# Patient Record
Sex: MALE | NOT HISPANIC OR LATINO
[De-identification: names, ages, dates, MRNs, and addresses within clinical notes are randomized per-mention and may not be internally consistent; named-entity substitution may affect disease eponyms.]

---

## 2024-04-03 PROBLEM — R22.2 MASS ON BACK: Status: ACTIVE | Noted: 2024-04-03

## 2024-04-04 ENCOUNTER — APPOINTMENT (OUTPATIENT)
Dept: SURGICAL ONCOLOGY | Facility: CLINIC | Age: 54
End: 2024-04-04
Payer: COMMERCIAL

## 2024-04-04 ENCOUNTER — RESULT REVIEW (OUTPATIENT)
Age: 54
End: 2024-04-04

## 2024-04-04 VITALS
OXYGEN SATURATION: 98 % | RESPIRATION RATE: 17 BRPM | HEART RATE: 87 BPM | BODY MASS INDEX: 24.29 KG/M2 | SYSTOLIC BLOOD PRESSURE: 116 MMHG | WEIGHT: 164 LBS | HEIGHT: 69 IN | DIASTOLIC BLOOD PRESSURE: 76 MMHG

## 2024-04-04 DIAGNOSIS — R22.2 LOCALIZED SWELLING, MASS AND LUMP, TRUNK: ICD-10-CM

## 2024-04-04 PROCEDURE — 99204 OFFICE O/P NEW MOD 45 MIN: CPT

## 2024-04-04 NOTE — END OF VISIT
Called [facility] and that medication is on a nationwide back order, not just them.  They need a new medication sent.    [Time Spent: ___ minutes] : I have spent [unfilled] minutes of time on the encounter.

## 2024-04-24 ENCOUNTER — APPOINTMENT (OUTPATIENT)
Dept: ULTRASOUND IMAGING | Facility: IMAGING CENTER | Age: 54
End: 2024-04-24
Payer: COMMERCIAL

## 2024-04-24 ENCOUNTER — OUTPATIENT (OUTPATIENT)
Dept: OUTPATIENT SERVICES | Facility: HOSPITAL | Age: 54
LOS: 1 days | End: 2024-04-24
Payer: COMMERCIAL

## 2024-04-24 ENCOUNTER — RESULT REVIEW (OUTPATIENT)
Age: 54
End: 2024-04-24

## 2024-04-24 DIAGNOSIS — R22.2 LOCALIZED SWELLING, MASS AND LUMP, TRUNK: ICD-10-CM

## 2024-04-24 PROCEDURE — 88173 CYTOPATH EVAL FNA REPORT: CPT | Mod: 26

## 2024-04-24 PROCEDURE — 10005 FNA BX W/US GDN 1ST LES: CPT

## 2024-04-24 PROCEDURE — 88305 TISSUE EXAM BY PATHOLOGIST: CPT | Mod: 26

## 2024-04-24 PROCEDURE — 88305 TISSUE EXAM BY PATHOLOGIST: CPT

## 2024-04-24 PROCEDURE — 88172 CYTP DX EVAL FNA 1ST EA SITE: CPT

## 2024-04-24 PROCEDURE — 88173 CYTOPATH EVAL FNA REPORT: CPT

## 2024-04-29 LAB — NON-GYNECOLOGICAL CYTOLOGY STUDY: SIGNIFICANT CHANGE UP

## 2024-04-30 NOTE — ASSESSMENT
[FreeTextEntry1] : 53-year-old man.  Referred with a ~10 cm soft tissue mass of the left lower back that on imaging appears to be subcutaneous in location. Differential diagnosis based on imaging includes a soft tissue neoplasm. Discs from [facility] retained and submitted for entry into our system.  I have recommended an image guided needle biopsy to establish a diagnosis, the results of which will guide further management.  Reviewed in detail, all questions answered.  He understands and agrees. Paperwork provided to have a sonogram guided needle biopsy performed through the health system. He will call me a week after the procedure to discuss the results. That conversation will guide further management.  He understands that even if benign, given the size, and the fact that it is symptomatic, but ultimately resection of the mass may be the safest course of action.  Presently, no note dictated since he was self-referred.   4/30/2024: We spoke on the phone. April 24, 2024, he had a needle biopsy of the lower back mass at 450. Results: Negative for malignant cells. Consistent with epidermal inclusion cyst.  He would like to have the area removed, but would like to wait until the fall 2024. He will call me when he is ready to schedule the operation.

## 2024-04-30 NOTE — PHYSICAL EXAM
[Normal] : supple, no neck mass and thyroid not enlarged [Normal Neck Lymph Nodes] : normal neck lymph nodes  [Normal Supraclavicular Lymph Nodes] : normal supraclavicular lymph nodes [Normal Axillary Lymph Nodes] : normal axillary lymph nodes [Normal] : normal appearance, no rash, nodules, vesicles, ulcers, erythema [de-identified] : Groins not examined [de-identified] : See diagram. [de-identified] : See diagram.

## 2024-04-30 NOTE — REASON FOR VISIT
[Initial Consultation] : an initial consultation for [Other: _____] : [unfilled] [FreeTextEntry2] : ~10 cm subcutaneous mass of the left lower back

## 2024-04-30 NOTE — HISTORY OF PRESENT ILLNESS
[de-identified] : 53-year-old man.  Self-referred (he is a friend of my associate, Dr. Silas QUIROS).  CC: LEFT LOWER BACK MASS.  He has been aware of the mass for as long as he can remember.  Over the past 10 years he is intentionally lost 60 pounds, from a weight of 220, no more than 60 pounds. This has resulted in the mass being more prominent on self-exam.  The past 8-12 months he has had some associated discomfort and tenderness.   No other specific or constitutional signs or symptoms.   Imagin2023: Sonogram of the back at [facility]: 9.6 x 3.8 x 9.6 cm well-circumscribed subcutaneous mass of the left lower back at the level of the lower lumbar spine/upper sacrum. + Bilobed. Largely avascular on ultrasound. MRI suggested for further evaluation.    2023: Lumbar MRI without contrast: ~10 cm mass in the subcutaneous tissues of the left lower back.  2024: Lumbar MRI with contrast at [facility]: 9.6 x 10 x 3.3 cm lobulated subcutaneous mass. Unchanged from 2023 MRI. Differential diagnosis: Epidermal inclusion cyst versus proteinaceous fluid collection versus chronic fluid collection (?  Hematoma). ?   nerve sheath tumor or myxoma................    + Prior personal history: : Resection of a benign soft tissue mass of the upper inner left leg at [facility] in [place].   No personal history of malignancy.   + FH: Maternal grandmother: Spinal cancer. Paternal grandmother: Esophageal cancer.   PMD: Dr. Suresh KING.  NKDA.  No pacemaker or defibrillator. No anticoagulants.  No current prescription medications.   He has a pre-colonoscopy visit scheduled for later in 2024 with Dr. Abbe MARTELL. This will be for his baseline evaluation.

## 2024-09-26 ENCOUNTER — APPOINTMENT (OUTPATIENT)
Dept: PLASTIC SURGERY | Facility: CLINIC | Age: 54
End: 2024-09-26

## 2024-09-26 VITALS
HEIGHT: 69 IN | OXYGEN SATURATION: 98 % | BODY MASS INDEX: 24.14 KG/M2 | HEART RATE: 72 BPM | RESPIRATION RATE: 16 BRPM | TEMPERATURE: 98.2 F | SYSTOLIC BLOOD PRESSURE: 109 MMHG | DIASTOLIC BLOOD PRESSURE: 72 MMHG | WEIGHT: 163 LBS

## 2024-09-26 DIAGNOSIS — R22.2 LOCALIZED SWELLING, MASS AND LUMP, TRUNK: ICD-10-CM

## 2024-09-26 PROCEDURE — 99203 OFFICE O/P NEW LOW 30 MIN: CPT

## 2024-09-26 PROCEDURE — 99204 OFFICE O/P NEW MOD 45 MIN: CPT

## 2024-09-30 NOTE — ADDENDUM
[FreeTextEntry1] :  I, Bev Mendenhall, documented this note as a scribe on behalf of Dr. Mario Alberto Middleton MD on  09/26/2024.

## 2024-09-30 NOTE — DISCUSSION/SUMMARY
[TextEntry] : IKE FORRESTER is a 54 year old M who presents for initial consultation for reconstructive options after planned excision of mid lower back mass.  I explained that following excision there will be a full thickness defect of the involved area.  The reconstructive options will be based on the defect size and surrounding tissue laxity of the involved area.  Primary closure is only possible for smaller defects.  For larger defects, local tissue rearrangement or skin grafting may be necessary.  The patient was explained the risks of each option. Risks following layered primary closure or local tissue rearrangement include wound dehiscence, contour irregularity, bleeding, infection, and paraesthesias.  Risks following skin grafting include wound dehiscence, skin graft nonadherence (partial or complete), contour irregularity, bleeding, infection, paraesthesias, and donor site complications.  All questions were answered; the patient fully understands the risks and plan, agreeable to proceed.  - Plan for reconstruction of back wound, possible local flap, possible skin graft, tentatively 10/8/24 - Our office will coordinate with Dr. Rodrigez - Surgical paperwork submitted

## 2024-09-30 NOTE — CONSULT LETTER
[Dear  ___] : Dear  [unfilled], [Consult Letter:] : I had the pleasure of evaluating your patient, [unfilled]. [Please see my note below.] : Please see my note below. [Consult Closing:] : Thank you very much for allowing me to participate in the care of this patient.  If you have any questions, please do not hesitate to contact me. [Sincerely,] : Sincerely, [FreeTextEntry3] : Mario Alberto Middleton MD

## 2024-09-30 NOTE — END OF VISIT
[FreeTextEntry3] :  All medical record entries made by the Scribe were at my, Dr. Mario Alberto Middleton MD, direction and personally dictated by me on  09/26/2024. I have reviewed the chart and agree that the record accurately reflects my personal performance of the history, physical exam, assessment and plan. I have also personally directed, reviewed, and agreed with the chart.

## 2024-09-30 NOTE — HISTORY OF PRESENT ILLNESS
[FreeTextEntry1] : IKE FORRESTER is a 54 year old M who presents for initial consultation for reconstructive options after planned excision of mid lower back mass.   Patient reports having this mass on his back for a long time, thinks he was born with the mass. He endorses unintentional weight loss of 60 lbs over the past 10 years, which made the mass become more prominent and noticeable. More recently, the mass has been causing discomfort and tenderness, otherwise denies bleeding or drainage.  He had a sonogram of the back done 11/13/23, notable for 9.6 x 3.8 x 9.6cm well-circumscribed subcutaneous mass of the left lower back at the level of the lower lumbar spine/upper sacrum, +bilobed, largely avascular on ultrasound. He had a lumbar MRI w/o contrast done 12/2023, notable for ~10cm mass in the subcutaneous tissues of the left lower back.  s/p repeat MRI w/ contrast 2/7/24, notable for 9.6 x 10 x 3.3cm lobulated subcutaneous mass, unchanged compared to prior.  He was evaluated by Dr. Rodrigez, who recommended US guided bx which was neg for malignant cells, path c/w epidermal inclusion cyst, and surgical excision.  Of note, he has a hx of resection of benign soft tissue mass of upper inner left leg at [facility] in [place] (1986), otherwise denies personal hx of malignancy. At this time, denies cp, sob, subjective fever, chills, headache, cough, myalgia, malaise, abd pain, n/v/d, decreased appetite, and generalized weakness.  PMHx: benign soft tissue mass of left upper inner leg (1986), denies personal hx of malignancy PSHx: resection of soft tissue mass of LLE (1986) Family hx: maternal grandmother +spinal cancer, paternal grandmother +esophageal cancer Allergies: NKDA Current meds: Denies Social hx: works as a restaurant owner

## 2024-09-30 NOTE — HISTORY OF PRESENT ILLNESS
[FreeTextEntry1] : IKE FORRESTER is a 54 year old M who presents for initial consultation for reconstructive options after planned excision of mid lower back mass.   Patient reports having this mass on his back for a long time, thinks he was born with the mass. He endorses unintentional weight loss of 60 lbs over the past 10 years, which made the mass become more prominent and noticeable. More recently, the mass has been causing discomfort and tenderness, otherwise denies bleeding or drainage.  He had a sonogram of the back done 11/13/23, notable for 9.6 x 3.8 x 9.6cm well-circumscribed subcutaneous mass of the left lower back at the level of the lower lumbar spine/upper sacrum, +bilobed, largely avascular on ultrasound. He had a lumbar MRI w/o contrast done 12/2023, notable for ~10cm mass in the subcutaneous tissues of the left lower back.  s/p repeat MRI w/ contrast 2/7/24, notable for 9.6 x 10 x 3.3cm lobulated subcutaneous mass, unchanged compared to prior.  He was evaluated by Dr. Rodrigez, who recommended US guided bx which was neg for malignant cells, path c/w epidermal inclusion cyst, and surgical excision.  Of note, he has a hx of resection of benign soft tissue mass of upper inner left leg at [facility] in [place] (1986), otherwise denies personal hx of malignancy. At this time, denies cp, sob, subjective fever, chills, headache, cough, myalgia, malaise, abd pain, n/v/d, decreased appetite, and generalized weakness.  PMHx: benign soft tissue mass of left upper inner leg (1986), denies personal hx of malignancy PSHx: resection of soft tissue mass of LLE (1986) Family hx: maternal grandmother +spinal cancer, paternal grandmother +esophageal cancer Allergies: NKDA Current meds: Denies Social hx: works as a restaurant owner

## 2024-09-30 NOTE — PHYSICAL EXAM
[TextEntry] : Mid lower back: ~10cm, smooth, mobile mass, close to gluteal cleft, tender on palpation, no regional adenopathy, no open areas  Constitutional: NAD, well-nourished HENT: Normocephalic, atraumatic, PERRL, non-icteric sclerae, neck supple, trachea midline PULM: LSCTAB, no wheezing or rhonchi CV: RRR, no murmur, rubs, or gallops Abd: Soft, NT, ND, +BS, no palpable masses or bulge MSK: SCALES, 5/5 strength to all extremities Skin: No rash noted Neuro: A&O x 3, no FND Psych: Mood is appropriate